# Patient Record
Sex: FEMALE | Race: BLACK OR AFRICAN AMERICAN | NOT HISPANIC OR LATINO | Employment: OTHER | URBAN - METROPOLITAN AREA
[De-identification: names, ages, dates, MRNs, and addresses within clinical notes are randomized per-mention and may not be internally consistent; named-entity substitution may affect disease eponyms.]

---

## 2017-12-08 LAB — CRC RECOMMENDATION EXT: NORMAL

## 2019-05-03 LAB
HCV AB SER QL: NEGATIVE
HEP B SURFACE AG: NEGATIVE
HEPATITIS A IGM: NEGATIVE
HEPATITIS B CORE AB IGM: NEGATIVE

## 2019-05-09 PROBLEM — I25.10 CORONARY ARTERY DISEASE INVOLVING NATIVE CORONARY ARTERY OF NATIVE HEART WITHOUT ANGINA PECTORIS: Status: ACTIVE | Noted: 2019-05-09

## 2021-04-09 PROBLEM — E53.8 B12 DEFICIENCY: Status: ACTIVE | Noted: 2021-04-09

## 2021-05-27 PROBLEM — D69.6 THROMBOCYTOPENIA: Status: ACTIVE | Noted: 2021-05-27

## 2021-05-27 PROBLEM — D70.9 NEUTROPENIA: Status: ACTIVE | Noted: 2021-05-27

## 2021-08-23 PROBLEM — D70.9 NEUTROPENIA: Status: RESOLVED | Noted: 2021-05-27 | Resolved: 2021-08-23

## 2021-08-23 PROBLEM — D72.819 LEUKOPENIA: Status: RESOLVED | Noted: 2021-08-23 | Resolved: 2021-08-23

## 2021-08-23 PROBLEM — D69.6 THROMBOCYTOPENIA: Status: RESOLVED | Noted: 2021-05-27 | Resolved: 2021-08-23

## 2021-08-23 PROBLEM — D75.89 BICYTOPENIA: Status: ACTIVE | Noted: 2021-08-23

## 2021-08-23 PROBLEM — U07.1 LAB TEST POSITIVE FOR DETECTION OF COVID-19 VIRUS: Status: ACTIVE | Noted: 2021-08-23

## 2021-08-23 PROBLEM — D72.819 LEUKOPENIA: Status: ACTIVE | Noted: 2021-08-23

## 2021-08-23 PROBLEM — N39.0 UTI (URINARY TRACT INFECTION): Status: ACTIVE | Noted: 2021-08-23

## 2021-08-23 PROBLEM — I61.9 INTRAPARENCHYMAL HEMORRHAGE OF BRAIN: Status: ACTIVE | Noted: 2021-08-23

## 2021-08-25 PROBLEM — G92.9 ENCEPHALOPATHY, TOXIC: Status: RESOLVED | Noted: 2021-08-25 | Resolved: 2021-08-25

## 2021-08-25 PROBLEM — G92.9 ENCEPHALOPATHY, TOXIC: Status: ACTIVE | Noted: 2021-08-25

## 2021-08-25 PROBLEM — R41.0 DISORIENTATION: Status: ACTIVE | Noted: 2021-08-25

## 2021-08-26 PROBLEM — E66.9 OBESITY: Status: ACTIVE | Noted: 2021-08-26

## 2022-07-05 PROBLEM — I25.2 OLD MI (MYOCARDIAL INFARCTION): Status: ACTIVE | Noted: 2022-07-05

## 2022-07-05 PROBLEM — H40.1130 PRIMARY OPEN ANGLE GLAUCOMA (POAG) OF BOTH EYES: Status: ACTIVE | Noted: 2022-07-05

## 2022-07-05 PROBLEM — L25.9 CONTACT DERMATITIS: Status: ACTIVE | Noted: 2022-07-05

## 2022-10-06 PROBLEM — Z23 FLU VACCINE NEED: Status: ACTIVE | Noted: 2022-10-06

## 2022-10-06 PROBLEM — R07.9 CHEST PAIN: Status: ACTIVE | Noted: 2022-10-06

## 2023-01-09 ENCOUNTER — PATIENT OUTREACH (OUTPATIENT)
Dept: ADMINISTRATIVE | Facility: HOSPITAL | Age: 73
End: 2023-01-09
Payer: MEDICAID

## 2023-02-01 DIAGNOSIS — E11.9 TYPE 2 DIABETES MELLITUS WITHOUT COMPLICATION: ICD-10-CM

## 2023-02-03 ENCOUNTER — PATIENT MESSAGE (OUTPATIENT)
Dept: ADMINISTRATIVE | Facility: HOSPITAL | Age: 73
End: 2023-02-03
Payer: MEDICAID

## 2023-02-17 PROBLEM — E11.3293 MILD NONPROLIFERATIVE DIABETIC RETINOPATHY OF BOTH EYES WITHOUT MACULAR EDEMA ASSOCIATED WITH TYPE 2 DIABETES MELLITUS: Status: ACTIVE | Noted: 2023-02-17

## 2023-02-18 PROBLEM — H02.883 MEIBOMIAN GLAND DYSFUNCTION (MGD) OF BOTH EYES: Status: ACTIVE | Noted: 2023-02-18

## 2023-02-18 PROBLEM — H02.886 MEIBOMIAN GLAND DYSFUNCTION (MGD) OF BOTH EYES: Status: ACTIVE | Noted: 2023-02-18

## 2024-05-21 ENCOUNTER — PATIENT OUTREACH (OUTPATIENT)
Dept: ADMINISTRATIVE | Facility: HOSPITAL | Age: 74
End: 2024-05-21
Payer: MEDICAID

## 2024-05-21 DIAGNOSIS — Z79.4 TYPE 2 DIABETES MELLITUS WITH OTHER SPECIFIED COMPLICATION, WITH LONG-TERM CURRENT USE OF INSULIN: Primary | ICD-10-CM

## 2024-05-21 DIAGNOSIS — E11.69 TYPE 2 DIABETES MELLITUS WITH OTHER SPECIFIED COMPLICATION, WITH LONG-TERM CURRENT USE OF INSULIN: Primary | ICD-10-CM

## 2024-09-06 ENCOUNTER — PATIENT OUTREACH (OUTPATIENT)
Dept: ADMINISTRATIVE | Facility: HOSPITAL | Age: 74
End: 2024-09-06
Payer: MEDICAID

## 2025-01-15 PROBLEM — N18.32 STAGE 3B CHRONIC KIDNEY DISEASE: Status: ACTIVE | Noted: 2025-01-15

## 2025-01-15 PROBLEM — E66.01 SEVERE OBESITY (BMI 35.0-39.9) WITH COMORBIDITY: Status: ACTIVE | Noted: 2021-08-26

## 2025-01-15 PROBLEM — Z12.31 ENCOUNTER FOR SCREENING MAMMOGRAM FOR MALIGNANT NEOPLASM OF BREAST: Status: ACTIVE | Noted: 2025-01-15

## 2025-04-16 ENCOUNTER — PATIENT OUTREACH (OUTPATIENT)
Dept: ADMINISTRATIVE | Facility: HOSPITAL | Age: 75
End: 2025-04-16
Payer: MEDICAID

## 2025-04-16 DIAGNOSIS — Z79.4 TYPE 2 DIABETES MELLITUS WITH OTHER SPECIFIED COMPLICATION, WITH LONG-TERM CURRENT USE OF INSULIN: Primary | ICD-10-CM

## 2025-04-16 DIAGNOSIS — E11.69 TYPE 2 DIABETES MELLITUS WITH OTHER SPECIFIED COMPLICATION, WITH LONG-TERM CURRENT USE OF INSULIN: Primary | ICD-10-CM

## 2025-05-20 PROBLEM — N17.9 AKI (ACUTE KIDNEY INJURY): Status: ACTIVE | Noted: 2025-05-20

## 2025-05-20 PROBLEM — Z79.4 TYPE 2 DIABETES MELLITUS, WITH LONG-TERM CURRENT USE OF INSULIN: Status: ACTIVE | Noted: 2018-08-22

## 2025-05-20 PROBLEM — F03.90 DEMENTIA: Status: ACTIVE | Noted: 2025-05-20

## 2025-05-20 PROBLEM — E11.9 TYPE 2 DIABETES MELLITUS, WITH LONG-TERM CURRENT USE OF INSULIN: Status: ACTIVE | Noted: 2018-08-22

## 2025-05-22 ENCOUNTER — PATIENT OUTREACH (OUTPATIENT)
Dept: ADMINISTRATIVE | Facility: CLINIC | Age: 75
End: 2025-05-22
Payer: MEDICAID

## 2025-05-22 NOTE — PROGRESS NOTES
C3 nurse attempted to contact Monae Crockett  for a TCC post hospital discharge follow up call. No answer . The patient has a scheduled HOSFU appointment with [facility] on 05/23/25 @ 1000.

## 2025-05-22 NOTE — PROGRESS NOTES
C3 nurse spoke with Monae Crockett 's daughter, Sepideh for a TCC post hospital discharge follow up call. The patient has a scheduled HOSFU appointment, confirmed

## 2025-07-21 PROBLEM — R41.82 AMS (ALTERED MENTAL STATUS): Status: ACTIVE | Noted: 2025-07-21

## 2025-07-21 PROBLEM — R29.818 ACUTE FOCAL NEUROLOGICAL DEFICIT: Status: ACTIVE | Noted: 2025-07-21

## 2025-07-21 PROBLEM — I95.9 TRANSIENT HYPOTENSION: Status: ACTIVE | Noted: 2025-07-21

## 2025-07-21 PROBLEM — Z98.890 S/P CARDIAC CATHETERIZATION: Status: ACTIVE | Noted: 2025-07-21

## 2025-07-23 PROBLEM — R09.89 SUSPECTED CEREBROVASCULAR ACCIDENT: Status: ACTIVE | Noted: 2025-07-23

## 2025-07-23 PROBLEM — G97.82 POSTOPERATIVE SURGICAL COMPLICATION INVOLVING NERVOUS SYSTEM ASSOCIATED WITH NON-NERVOUS SYSTEM PROCEDURE: Status: ACTIVE | Noted: 2025-07-23

## 2025-07-23 PROBLEM — R40.4 ALTERATION CONSCIOUSNESS: Status: ACTIVE | Noted: 2025-07-23

## 2025-07-24 ENCOUNTER — PATIENT OUTREACH (OUTPATIENT)
Dept: ADMINISTRATIVE | Facility: CLINIC | Age: 75
End: 2025-07-24
Payer: MEDICAID

## 2025-07-24 NOTE — PROGRESS NOTES
C3 nurse attempted to contact Monae Crockett  for a TCC post hospital discharge follow up call. No answer. No option to leave a voicemail. The patient has a scheduled HOSFU appointment with  Yeison Person MD on 8/8/2025 at 10 AM.

## 2025-07-25 NOTE — PROGRESS NOTES
C3 nurse spoke with Monae Crockett, patient's daughter, Sepideh, for a TCC post hospital discharge follow up call. The patient has a scheduled HOSFU appointment with HOSRIGO: Mounika Jane NP  on 8/18/2025 at 11 AM.